# Patient Record
Sex: MALE | Race: OTHER
[De-identification: names, ages, dates, MRNs, and addresses within clinical notes are randomized per-mention and may not be internally consistent; named-entity substitution may affect disease eponyms.]

---

## 2020-10-15 ENCOUNTER — HOSPITAL ENCOUNTER (EMERGENCY)
Dept: HOSPITAL 62 - ER | Age: 60
Discharge: HOME | End: 2020-10-15
Payer: COMMERCIAL

## 2020-10-15 VITALS — DIASTOLIC BLOOD PRESSURE: 96 MMHG | SYSTOLIC BLOOD PRESSURE: 186 MMHG

## 2020-10-15 DIAGNOSIS — Z85.048: ICD-10-CM

## 2020-10-15 DIAGNOSIS — F17.200: ICD-10-CM

## 2020-10-15 DIAGNOSIS — Z88.0: ICD-10-CM

## 2020-10-15 DIAGNOSIS — G43.009: Primary | ICD-10-CM

## 2020-10-15 DIAGNOSIS — Z79.899: ICD-10-CM

## 2020-10-15 DIAGNOSIS — E11.9: ICD-10-CM

## 2020-10-15 DIAGNOSIS — I10: ICD-10-CM

## 2020-10-15 PROCEDURE — 96374 THER/PROPH/DIAG INJ IV PUSH: CPT

## 2020-10-15 PROCEDURE — 96375 TX/PRO/DX INJ NEW DRUG ADDON: CPT

## 2020-10-15 PROCEDURE — 99284 EMERGENCY DEPT VISIT MOD MDM: CPT

## 2020-10-15 NOTE — ER DOCUMENT REPORT
ED General





- General


Chief Complaint: Headache


Stated Complaint: HEADACHE


Time Seen by Provider: 10/15/20 04:31


Primary Care Provider: 


MOHIT BOLANOS PA-C [Primary Care Provider] - Follow up as needed


TRAVEL OUTSIDE OF THE [place] IN LAST 30 DAYS: No





- HPI


Notes: 





Patient is a 59-year-old male who presents to the emergency department for 

evaluation of a headache.  Head hurts in the back of his head, and then again in

the frontal area.  He states this is typical of his normal headaches.  He states

that he gets one this bad once or twice a year.  He states that nothing seems to

make it better or worse.  He has tried Excedrin at home.  He denies any 

photophobia, phonophobia.  No fevers or chills.  No visual changes.  He has no 

difficulty speaking or swallowing.  He is moving his arms and legs without 

difficulty.





- Related Data


Allergies/Adverse Reactions: 


                                        





Penicillins Allergy (Unknown, Verified 10/15/20 04:13)


   








Home Medications: Nifedipine, Entresto, pramipexole





Past Medical History





- General


Information source: Patient





- Social History


Smoking Status: Current Every Day Smoker


Frequency of alcohol use: None


Drug Abuse: None


Family History: Reviewed & Not Pertinent





- Past Medical History


Cardiac Medical History: Reports: Hx Hypercholesterolemia - no meds, Hx 

Hypertension


Endocrine Medical History: Reports: Hx Diabetes Mellitus Type 1, Hx Diabetes 

Mellitus Type 2


Renal/ Medical History: Denies: Hx Peritoneal Dialysis


Malignancy Medical History: Reports Hx Colorectal Cancer


Musculoskeletal Medical History: Reports Hx Arthritis


Psychiatric Medical History: Reports: Hx Anxiety, Hx Depression


Past Surgical History: Reports: Hx Appendectomy, Hx Bowel Surgery - cancer, Hx 

Cholecystectomy





- Immunizations


Hx Diphtheria, Pertussis, Tetanus Vaccination: Yes





Review of Systems





- Review of Systems


Constitutional: No symptoms reported


EENT: No symptoms reported


Cardiovascular: No symptoms reported


Respiratory: No symptoms reported


Gastrointestinal: No symptoms reported


Genitourinary: No symptoms reported


Musculoskeletal: No symptoms reported


Skin: No symptoms reported


Neurological/Psychological: See HPI


-: Yes All other systems reviewed and negative





Physical Exam





- Vital signs


Vitals: 


                                        











Temp Pulse Resp BP Pulse Ox


 


 97.7 F   66   16   206/115 H  98 


 


 10/15/20 04:10  10/15/20 04:10  10/15/20 04:10  10/15/20 04:10  10/15/20 04:10














- Notes


Notes: 





Vital signs reviewed, please refer to chart. Head is normocephalic, atraumatic. 

Pupils equal round, reactive to light.  Neck is supple without meningismus.  

Heart is regular rate and rhythm.  Lungs are clear to auscultation bilaterally. 

Abdomen is soft, nontender, normoactive bowel sounds throughout.  Extremities 

without cyanosis, clubbing. Posterior calves are nontender.  Peripheral pulses 

are equal.  Skin is warm and dry.  Patient is awake, alert, oriented x3.  

Cranial nerves II - XII are grossly intact without focal neurological deficits. 

Strength is plus 5 out of 5 bilateral upper and lower extremities.  Sensation is

intact.  Reflexes symmetrical.  Intact finger-nose-finger, rapid alternating 

movements, heel-to-shin.





Course





- Re-evaluation


Re-evalutation: 





10/15/20 05:26


Patient presents to the emergency department for evaluation.  He has marked 

hypertension, but he does have a history of hypertension.  I believe his 

elevated blood pressure was secondary to the increased pain that he was having. 

Patient has been taking his medications as prescribed.  He admits he has not 

been checking his blood pressure closely, as he needs batteries for his blood 

pressure cuff.  At any rate, he has absolutely no signs of endorgan damage.  He 

has no chest pain, no shortness of breath.  He has good oxygenation.  He has a 

normal neurological exam.  He was medicated with Toradol, Reglan, Benadryl, IV 

fluids.  He feels significantly better.  He would like to go home.  I told him 

he needs to follow-up closely with his primary care provider in regards to his 

elevated blood pressure.  He voiced understanding.  He is to return to the 

emergency department with worsening or new concerning symptoms of any sort.





- Vital Signs


Vital signs: 


                                        











Temp Pulse Resp BP Pulse Ox


 


 97.7 F   66   16   210/108 H  98 


 


 10/15/20 04:10  10/15/20 04:10  10/15/20 04:10  10/15/20 04:12  10/15/20 04:10














Discharge





- Discharge


Clinical Impression: 


 Elevated blood pressure reading





Migraine headache


Qualifiers:


 Migraine type: without aura Status migrainosus presence: without status 

migrainosus Intractability: not intractable Qualified Code(s): G43.009 - 

Migraine without aura, not intractable, without status migrainosus





Condition: Stable


Disposition: HOME, SELF-CARE


Instructions:  Headache ([facility]), Toradol Injection ([facility]), Reglan ([facility]), High Blood

Pressure ([facility])


Additional Instructions: 


Your blood pressure was markedly elevated here in the emergency department.  

Please follow-up with your primary care provider regarding this issue this week.

 Continue to take your home medications as prescribed, continue to keep a record

of your blood pressures at home and bring this with you to your next primary 

care provider appointment.  Return to the emergency department for worsening or 

new concerning symptoms of any sort.


Referrals: 


MOHIT BOLANOS PA-C [Primary Care Provider] - Follow up as needed